# Patient Record
Sex: FEMALE | Race: WHITE | NOT HISPANIC OR LATINO | Employment: FULL TIME | URBAN - METROPOLITAN AREA
[De-identification: names, ages, dates, MRNs, and addresses within clinical notes are randomized per-mention and may not be internally consistent; named-entity substitution may affect disease eponyms.]

---

## 2023-04-26 ENCOUNTER — APPOINTMENT (OUTPATIENT)
Dept: PRIMARY CARE | Facility: CLINIC | Age: 53
End: 2023-04-26
Payer: COMMERCIAL

## 2023-05-24 DIAGNOSIS — K21.00 GASTROESOPHAGEAL REFLUX DISEASE WITH ESOPHAGITIS, UNSPECIFIED WHETHER HEMORRHAGE: ICD-10-CM

## 2023-05-24 RX ORDER — OMEPRAZOLE 40 MG/1
40 CAPSULE, DELAYED RELEASE ORAL DAILY
Qty: 30 CAPSULE | Refills: 0 | Status: SHIPPED | OUTPATIENT
Start: 2023-05-24 | End: 2023-06-29 | Stop reason: SDUPTHER

## 2023-05-24 RX ORDER — OMEPRAZOLE 40 MG/1
1 CAPSULE, DELAYED RELEASE ORAL DAILY
COMMUNITY
Start: 2012-12-27 | End: 2023-05-24 | Stop reason: SDUPTHER

## 2023-06-29 ENCOUNTER — OFFICE VISIT (OUTPATIENT)
Dept: PRIMARY CARE | Facility: CLINIC | Age: 53
End: 2023-06-29
Payer: COMMERCIAL

## 2023-06-29 VITALS
SYSTOLIC BLOOD PRESSURE: 104 MMHG | DIASTOLIC BLOOD PRESSURE: 66 MMHG | HEART RATE: 72 BPM | TEMPERATURE: 97.5 F | HEIGHT: 61 IN | WEIGHT: 167 LBS | BODY MASS INDEX: 31.53 KG/M2 | RESPIRATION RATE: 16 BRPM

## 2023-06-29 DIAGNOSIS — K42.9 UMBILICAL HERNIA WITHOUT OBSTRUCTION AND WITHOUT GANGRENE: ICD-10-CM

## 2023-06-29 DIAGNOSIS — K21.00 GASTROESOPHAGEAL REFLUX DISEASE WITH ESOPHAGITIS WITHOUT HEMORRHAGE: Primary | ICD-10-CM

## 2023-06-29 DIAGNOSIS — K21.00 GASTROESOPHAGEAL REFLUX DISEASE WITH ESOPHAGITIS, UNSPECIFIED WHETHER HEMORRHAGE: ICD-10-CM

## 2023-06-29 DIAGNOSIS — Z00.00 WELL ADULT HEALTH CHECK: ICD-10-CM

## 2023-06-29 DIAGNOSIS — E11.9 TYPE 2 DIABETES MELLITUS WITHOUT COMPLICATION, WITHOUT LONG-TERM CURRENT USE OF INSULIN (MULTI): ICD-10-CM

## 2023-06-29 DIAGNOSIS — Z23 IMMUNIZATION DUE: ICD-10-CM

## 2023-06-29 DIAGNOSIS — K22.719 BARRETT'S ESOPHAGUS WITH DYSPLASIA: ICD-10-CM

## 2023-06-29 PROBLEM — K22.70 BARRETT ESOPHAGUS: Status: ACTIVE | Noted: 2023-06-29

## 2023-06-29 PROBLEM — E66.811 OBESITY (BMI 30.0-34.9): Status: ACTIVE | Noted: 2023-06-07

## 2023-06-29 PROBLEM — E66.9 OBESITY (BMI 30.0-34.9): Status: ACTIVE | Noted: 2023-06-07

## 2023-06-29 PROBLEM — R92.8 MAMMOGRAM ABNORMAL: Status: ACTIVE | Noted: 2023-06-29

## 2023-06-29 PROCEDURE — 90472 IMMUNIZATION ADMIN EACH ADD: CPT | Performed by: INTERNAL MEDICINE

## 2023-06-29 PROCEDURE — 3074F SYST BP LT 130 MM HG: CPT | Performed by: INTERNAL MEDICINE

## 2023-06-29 PROCEDURE — 99204 OFFICE O/P NEW MOD 45 MIN: CPT | Performed by: INTERNAL MEDICINE

## 2023-06-29 PROCEDURE — 90677 PCV20 VACCINE IM: CPT | Performed by: INTERNAL MEDICINE

## 2023-06-29 PROCEDURE — 90471 IMMUNIZATION ADMIN: CPT | Performed by: INTERNAL MEDICINE

## 2023-06-29 PROCEDURE — 3078F DIAST BP <80 MM HG: CPT | Performed by: INTERNAL MEDICINE

## 2023-06-29 PROCEDURE — 1036F TOBACCO NON-USER: CPT | Performed by: INTERNAL MEDICINE

## 2023-06-29 PROCEDURE — 90715 TDAP VACCINE 7 YRS/> IM: CPT | Performed by: INTERNAL MEDICINE

## 2023-06-29 RX ORDER — OMEPRAZOLE 40 MG/1
40 CAPSULE, DELAYED RELEASE ORAL DAILY
Qty: 90 CAPSULE | Refills: 3 | Status: SHIPPED | OUTPATIENT
Start: 2023-06-29 | End: 2024-03-25 | Stop reason: SDUPTHER

## 2023-06-29 RX ORDER — METFORMIN HYDROCHLORIDE 500 MG/1
2 TABLET ORAL 2 TIMES DAILY
COMMUNITY
Start: 2013-10-30

## 2023-06-29 RX ORDER — TIRZEPATIDE 10 MG/.5ML
12.5 INJECTION, SOLUTION SUBCUTANEOUS
COMMUNITY
Start: 2023-05-02

## 2023-06-29 ASSESSMENT — ENCOUNTER SYMPTOMS
DIAPHORESIS: 0
ARTHRALGIAS: 0
EYE DISCHARGE: 0
WEAKNESS: 0
VOMITING: 0
NAUSEA: 0
HEADACHES: 0
COUGH: 0
SHORTNESS OF BREATH: 0
SPEECH DIFFICULTY: 0
FATIGUE: 0
JOINT SWELLING: 0
WHEEZING: 0
DIZZINESS: 0
CHEST TIGHTNESS: 0
DIARRHEA: 0
SORE THROAT: 0
APPETITE CHANGE: 0
SINUS PAIN: 0
FEVER: 0
DIFFICULTY URINATING: 0
FREQUENCY: 0
PALPITATIONS: 0
ABDOMINAL PAIN: 0
CHILLS: 0
CONSTIPATION: 0

## 2023-06-29 ASSESSMENT — PATIENT HEALTH QUESTIONNAIRE - PHQ9
1. LITTLE INTEREST OR PLEASURE IN DOING THINGS: NOT AT ALL
2. FEELING DOWN, DEPRESSED OR HOPELESS: NOT AT ALL
SUM OF ALL RESPONSES TO PHQ9 QUESTIONS 1 & 2: 0

## 2023-06-29 NOTE — PROGRESS NOTES
"Subjective   Patient ID: Lori Post is a 53 y.o. female who presents for OTHER (New pt , does see endocine,  requesting omperazole).    New patient and needs refills.  Reviewed past medical history and medications for DM and GERD Yeboah's esophagus.    No labs available.           Review of Systems   Constitutional:  Negative for appetite change, chills, diaphoresis, fatigue and fever.   HENT:  Negative for congestion, ear discharge, ear pain, sinus pain and sore throat.    Eyes:  Negative for discharge.   Respiratory:  Negative for cough, chest tightness, shortness of breath and wheezing.    Cardiovascular:  Negative for chest pain, palpitations and leg swelling.   Gastrointestinal:  Negative for abdominal pain, constipation, diarrhea, nausea and vomiting.   Endocrine: Negative for cold intolerance, heat intolerance and polyuria.   Genitourinary:  Negative for difficulty urinating and frequency.   Musculoskeletal:  Negative for arthralgias and joint swelling.   Skin:  Negative for rash.   Neurological:  Negative for dizziness, speech difficulty, weakness and headaches.       Objective   /66 (BP Location: Left arm, Patient Position: Sitting, BP Cuff Size: Adult)   Pulse 72   Temp 36.4 °C (97.5 °F) (Temporal)   Resp 16   Ht 1.549 m (5' 1\")   Wt 75.8 kg (167 lb)   BMI 31.55 kg/m²     Physical Exam  Constitutional:       Appearance: Normal appearance.   HENT:      Head: Normocephalic.   Eyes:      Conjunctiva/sclera: Conjunctivae normal.   Cardiovascular:      Rate and Rhythm: Normal rate and regular rhythm.   Pulmonary:      Effort: Pulmonary effort is normal.      Breath sounds: Normal breath sounds.   Abdominal:      Comments: Binder in place post op.   Musculoskeletal:      Cervical back: Neck supple.   Skin:     General: Skin is warm and dry.   Neurological:      Mental Status: She is alert.         Assessment/Plan   Problem List Items Addressed This Visit       GERD with esophagitis - Primary    " Yeboah esophagus    Type 2 diabetes mellitus without complication, without long-term current use of insulin (CMS/HCC)    Relevant Orders    CT cardiac scoring wo IV contrast    Umbilical hernia without obstruction and without gangrene     Other Visit Diagnoses       Well adult health check        Relevant Orders    CT cardiac scoring wo IV contrast    Immunization due        Relevant Orders    Pneumococcal conjugate vaccine, 20-valent, adult (PREVNAR 20)    Tdap vaccine, age 10 years and older (BOOSTRIX)

## 2023-09-29 ENCOUNTER — OFFICE VISIT (OUTPATIENT)
Dept: PRIMARY CARE | Facility: CLINIC | Age: 53
End: 2023-09-29
Payer: COMMERCIAL

## 2023-09-29 VITALS
BODY MASS INDEX: 28.47 KG/M2 | HEART RATE: 86 BPM | RESPIRATION RATE: 16 BRPM | WEIGHT: 150.8 LBS | HEIGHT: 61 IN | SYSTOLIC BLOOD PRESSURE: 103 MMHG | TEMPERATURE: 97.8 F | DIASTOLIC BLOOD PRESSURE: 73 MMHG | OXYGEN SATURATION: 95 %

## 2023-09-29 DIAGNOSIS — K22.719 BARRETT'S ESOPHAGUS WITH DYSPLASIA: ICD-10-CM

## 2023-09-29 DIAGNOSIS — K42.9 UMBILICAL HERNIA WITHOUT OBSTRUCTION AND WITHOUT GANGRENE: ICD-10-CM

## 2023-09-29 DIAGNOSIS — Z23 IMMUNIZATION DUE: ICD-10-CM

## 2023-09-29 DIAGNOSIS — Z12.31 BREAST CANCER SCREENING BY MAMMOGRAM: ICD-10-CM

## 2023-09-29 DIAGNOSIS — E11.9 TYPE 2 DIABETES MELLITUS WITHOUT COMPLICATION, WITHOUT LONG-TERM CURRENT USE OF INSULIN (MULTI): ICD-10-CM

## 2023-09-29 DIAGNOSIS — R63.4 WEIGHT LOSS: ICD-10-CM

## 2023-09-29 DIAGNOSIS — K21.00 GASTROESOPHAGEAL REFLUX DISEASE WITH ESOPHAGITIS WITHOUT HEMORRHAGE: Primary | ICD-10-CM

## 2023-09-29 DIAGNOSIS — Z00.00 WELL ADULT HEALTH CHECK: ICD-10-CM

## 2023-09-29 DIAGNOSIS — K12.1 STOMATITIS: ICD-10-CM

## 2023-09-29 PROBLEM — Z98.890 H/O UMBILICAL HERNIA REPAIR: Status: ACTIVE | Noted: 2023-09-29

## 2023-09-29 PROBLEM — E66.811 OBESITY (BMI 30.0-34.9): Status: RESOLVED | Noted: 2023-06-07 | Resolved: 2023-09-29

## 2023-09-29 PROBLEM — Z87.19 H/O UMBILICAL HERNIA REPAIR: Status: ACTIVE | Noted: 2023-09-29

## 2023-09-29 PROBLEM — E66.9 OBESITY (BMI 30.0-34.9): Status: RESOLVED | Noted: 2023-06-07 | Resolved: 2023-09-29

## 2023-09-29 PROCEDURE — 90471 IMMUNIZATION ADMIN: CPT | Performed by: INTERNAL MEDICINE

## 2023-09-29 PROCEDURE — 90739 HEPB VACC 2/4 DOSE ADULT IM: CPT | Performed by: INTERNAL MEDICINE

## 2023-09-29 PROCEDURE — 90632 HEPA VACCINE ADULT IM: CPT | Performed by: INTERNAL MEDICINE

## 2023-09-29 PROCEDURE — 3078F DIAST BP <80 MM HG: CPT | Performed by: INTERNAL MEDICINE

## 2023-09-29 PROCEDURE — 3074F SYST BP LT 130 MM HG: CPT | Performed by: INTERNAL MEDICINE

## 2023-09-29 PROCEDURE — 99396 PREV VISIT EST AGE 40-64: CPT | Performed by: INTERNAL MEDICINE

## 2023-09-29 PROCEDURE — 90472 IMMUNIZATION ADMIN EACH ADD: CPT | Performed by: INTERNAL MEDICINE

## 2023-09-29 PROCEDURE — 3008F BODY MASS INDEX DOCD: CPT | Performed by: INTERNAL MEDICINE

## 2023-09-29 PROCEDURE — 1036F TOBACCO NON-USER: CPT | Performed by: INTERNAL MEDICINE

## 2023-09-29 RX ORDER — MULTIVITAMIN
1 TABLET ORAL DAILY
COMMUNITY

## 2023-09-29 ASSESSMENT — PAIN SCALES - GENERAL: PAINLEVEL: 0-NO PAIN

## 2023-09-29 NOTE — PROGRESS NOTES
"Subjective   Lori Post is a 53 y.o. female who presents for Annual Exam.      Well Adult Physical   Patient here for a comprehensive physical exam.The patient reports problems - dry lips, and not sure why due to the fact that she does not lick her lips, she has read online that it could be low B12     Do you take any herbs or supplements that were not prescribed by a doctor? no   Are you taking calcium supplements? no   Are you taking aspirin daily? no     History:  LMP: No LMP recorded.  Menopause at 50-51 years  Last pap date: about a year ago, has an apt. Coming up in  with her gynecologist   Abnormal pap? no  : 2  Para: 3  Last mammogram was 10.24.2022- getting mammogram next month as well             Review of Systems   All other systems reviewed and are negative.      Objective   /73   Pulse 86   Temp 36.6 °C (97.8 °F)   Resp 16   Ht 1.549 m (5' 1\")   Wt 68.4 kg (150 lb 12.8 oz)   SpO2 95%   BMI 28.49 kg/m²       Physical Exam  Constitutional:       Appearance: Normal appearance.   HENT:      Head: Normocephalic.   Eyes:      Conjunctiva/sclera: Conjunctivae normal.   Cardiovascular:      Rate and Rhythm: Normal rate and regular rhythm.   Pulmonary:      Effort: Pulmonary effort is normal.      Breath sounds: Normal breath sounds.   Abdominal:      General: Abdomen is flat.      Palpations: Abdomen is soft.   Musculoskeletal:      Cervical back: Neck supple.   Skin:     General: Skin is warm and dry.   Neurological:      Mental Status: She is alert.         Assessment/Plan   Problem List Items Addressed This Visit       GERD with esophagitis - Primary    Relevant Orders    Referral to General Surgery    CBC    Yeboah esophagus    Relevant Orders    Referral to General Surgery    CBC    Type 2 diabetes mellitus without complication, without long-term current use of insulin (CMS/[facility])    Relevant Orders    CBC    RESOLVED: Umbilical hernia without obstruction and without " gangrene    Weight loss     Began October of 2022 at 222.  Loss of 72 pounds since then.  Outstanding turnaround with exercise and diet.            Other Visit Diagnoses       Breast cancer screening by mammogram        Relevant Orders    CBC    BI mammo bilateral screening tomosynthesis    Well adult health check        Relevant Orders    Referral to General Surgery    Lipid Panel    TSH with reflex to Free T4 if abnormal    Comprehensive Metabolic Panel    CBC    BI mammo bilateral screening tomosynthesis    Hepatitis A vaccine, age 19 years and greater (HAVRIX) (Completed)    Hepatitis B vaccine, adult (HEPLISAV) (Completed)    Stomatitis        Relevant Orders    Vitamin B12    Vitamin D 25-Hydroxy,Total (for eval of Vitamin D levels)    Zinc    Immunization due        Relevant Orders    Hepatitis A vaccine, age 19 years and greater (HAVRIX) (Completed)    Hepatitis B vaccine, adult (HEPLISAV) (Completed)    BMI 28.0-28.9,adult              Encounter Diagnoses   Name Primary?    Gastroesophageal reflux disease with esophagitis without hemorrhage Yes    Yeboah's esophagus with dysplasia     Type 2 diabetes mellitus without complication, without long-term current use of insulin (CMS/HCC)     Breast cancer screening by mammogram     Well adult health check     Weight loss     Umbilical hernia without obstruction and without gangrene     Stomatitis     Immunization due     BMI 28.0-28.9,adult      Real Iglesias, DO

## 2023-09-29 NOTE — ASSESSMENT & PLAN NOTE
Began October of 2022 at 222.  Loss of 72 pounds since then.  Outstanding turnaround with exercise and diet.

## 2023-10-20 ENCOUNTER — ANCILLARY PROCEDURE (OUTPATIENT)
Dept: RADIOLOGY | Facility: CLINIC | Age: 53
End: 2023-10-20
Payer: COMMERCIAL

## 2023-10-20 DIAGNOSIS — Z00.00 WELL ADULT HEALTH CHECK: ICD-10-CM

## 2023-10-20 DIAGNOSIS — Z12.31 BREAST CANCER SCREENING BY MAMMOGRAM: ICD-10-CM

## 2023-10-20 PROCEDURE — 77067 SCR MAMMO BI INCL CAD: CPT

## 2023-10-20 PROCEDURE — 77063 BREAST TOMOSYNTHESIS BI: CPT | Mod: BILATERAL PROCEDURE | Performed by: RADIOLOGY

## 2023-10-20 PROCEDURE — 77067 SCR MAMMO BI INCL CAD: CPT | Mod: BILATERAL PROCEDURE | Performed by: RADIOLOGY

## 2024-03-25 ENCOUNTER — HOSPITAL ENCOUNTER (OUTPATIENT)
Dept: RADIOLOGY | Facility: CLINIC | Age: 54
Discharge: HOME | End: 2024-03-25
Payer: COMMERCIAL

## 2024-03-25 ENCOUNTER — OFFICE VISIT (OUTPATIENT)
Dept: PRIMARY CARE | Facility: CLINIC | Age: 54
End: 2024-03-25
Payer: COMMERCIAL

## 2024-03-25 VITALS
TEMPERATURE: 97.6 F | DIASTOLIC BLOOD PRESSURE: 76 MMHG | OXYGEN SATURATION: 98 % | WEIGHT: 139 LBS | HEART RATE: 78 BPM | RESPIRATION RATE: 16 BRPM | BODY MASS INDEX: 23.16 KG/M2 | HEIGHT: 65 IN | SYSTOLIC BLOOD PRESSURE: 118 MMHG

## 2024-03-25 DIAGNOSIS — K21.00 GASTROESOPHAGEAL REFLUX DISEASE WITH ESOPHAGITIS, UNSPECIFIED WHETHER HEMORRHAGE: ICD-10-CM

## 2024-03-25 DIAGNOSIS — M53.3 CHRONIC COCCYGEAL PAIN: ICD-10-CM

## 2024-03-25 DIAGNOSIS — Z00.00 WELL ADULT HEALTH CHECK: ICD-10-CM

## 2024-03-25 DIAGNOSIS — E11.9 TYPE 2 DIABETES MELLITUS WITHOUT COMPLICATION, WITHOUT LONG-TERM CURRENT USE OF INSULIN (MULTI): ICD-10-CM

## 2024-03-25 DIAGNOSIS — G89.29 CHRONIC COCCYGEAL PAIN: ICD-10-CM

## 2024-03-25 DIAGNOSIS — Z23 IMMUNIZATION DUE: Primary | ICD-10-CM

## 2024-03-25 PROCEDURE — 3078F DIAST BP <80 MM HG: CPT | Performed by: INTERNAL MEDICINE

## 2024-03-25 PROCEDURE — 90471 IMMUNIZATION ADMIN: CPT | Performed by: INTERNAL MEDICINE

## 2024-03-25 PROCEDURE — 72220 X-RAY EXAM SACRUM TAILBONE: CPT

## 2024-03-25 PROCEDURE — 99213 OFFICE O/P EST LOW 20 MIN: CPT | Performed by: INTERNAL MEDICINE

## 2024-03-25 PROCEDURE — 90632 HEPA VACCINE ADULT IM: CPT | Performed by: INTERNAL MEDICINE

## 2024-03-25 PROCEDURE — 72220 X-RAY EXAM SACRUM TAILBONE: CPT | Performed by: RADIOLOGY

## 2024-03-25 PROCEDURE — 3008F BODY MASS INDEX DOCD: CPT | Performed by: INTERNAL MEDICINE

## 2024-03-25 PROCEDURE — 3074F SYST BP LT 130 MM HG: CPT | Performed by: INTERNAL MEDICINE

## 2024-03-25 PROCEDURE — 90739 HEPB VACC 2/4 DOSE ADULT IM: CPT | Performed by: INTERNAL MEDICINE

## 2024-03-25 PROCEDURE — 1036F TOBACCO NON-USER: CPT | Performed by: INTERNAL MEDICINE

## 2024-03-25 PROCEDURE — 90472 IMMUNIZATION ADMIN EACH ADD: CPT | Performed by: INTERNAL MEDICINE

## 2024-03-25 RX ORDER — OMEPRAZOLE 40 MG/1
40 CAPSULE, DELAYED RELEASE ORAL DAILY
Qty: 90 CAPSULE | Refills: 3 | Status: SHIPPED | OUTPATIENT
Start: 2024-03-25 | End: 2025-03-25

## 2024-03-25 ASSESSMENT — PAIN SCALES - GENERAL: PAINLEVEL: 6

## 2024-03-25 NOTE — PROGRESS NOTES
"Subjective   Lori Post is a 54 y.o. female who presents for Follow-up (Labs ) and tail bone pain   Refill omeprazole     Patient seeing an endocrinologist through [facility], last A1C was 5.5% on 3.4.2024        Patient states that she has been having some tailbone pain for at least 6 months states that she sits most of the day   Pain does not radiate.  Patient feels like when she sits in the even time to watch tv she will have to constantly readjust to even out the weight  Patient states that she has not had any falls or injury to the tailbone area.  Patient will wake up with no pain, then pain will progress throughout the day.  Thinks it is due to weight loss and does sit all day.  Does not notice while at work but does not use a cushion.           Review of Systems   All other systems reviewed and are negative.      Objective   /76   Pulse 78   Temp 36.4 °C (97.6 °F)   Resp 16   Ht 1.651 m (5' 5\")   Wt 63 kg (139 lb)   SpO2 98%   BMI 23.13 kg/m²       Physical Exam  Constitutional:       Appearance: Normal appearance.   HENT:      Head: Normocephalic.   Eyes:      Conjunctiva/sclera: Conjunctivae normal.   Cardiovascular:      Rate and Rhythm: Normal rate and regular rhythm.      Heart sounds: Normal heart sounds.   Pulmonary:      Effort: Pulmonary effort is normal.      Breath sounds: Normal breath sounds.   Musculoskeletal:      Cervical back: Neck supple.      Comments: Tenderness and coccyx, to palpation but tolerated exam well.    Skin:     General: Skin is warm and dry.   Neurological:      Mental Status: She is alert.   Psychiatric:         Mood and Affect: Mood normal.         Assessment/Plan   Problem List Items Addressed This Visit       GERD with esophagitis    Relevant Medications    omeprazole (PriLOSEC) 40 mg DR capsule    Type 2 diabetes mellitus without complication, without long-term current use of insulin (CMS/[facility])     Other Visit Diagnoses       Immunization due    -  Primary    " Relevant Orders    Hepatitis A vaccine, age 19 years and greater (HAVRIX)    Well adult health check        Relevant Orders    Hepatitis B vaccine, adult (HEPLISAV)    Chronic coccygeal pain        Relevant Orders    XR sacrum coccyx 2+ views          Encounter Diagnoses   Name Primary?    Gastroesophageal reflux disease with esophagitis, unspecified whether hemorrhage     Type 2 diabetes mellitus without complication, without long-term current use of insulin (CMS/[facility])     Immunization due Yes    Well adult health check     Chronic coccygeal pain      Will give second set of immunizations.  Concern about pain at tailbone and has a history of bruising there.    Check x-ray results and follow up in six months if this is negative.  Recommended cushion for long periods of sitting.      Real Iglesias, DO

## 2024-09-30 ENCOUNTER — APPOINTMENT (OUTPATIENT)
Dept: PRIMARY CARE | Facility: CLINIC | Age: 54
End: 2024-09-30
Payer: COMMERCIAL

## 2024-09-30 VITALS
WEIGHT: 136.4 LBS | SYSTOLIC BLOOD PRESSURE: 104 MMHG | RESPIRATION RATE: 16 BRPM | DIASTOLIC BLOOD PRESSURE: 62 MMHG | OXYGEN SATURATION: 95 % | HEIGHT: 61 IN | TEMPERATURE: 97.6 F | BODY MASS INDEX: 25.75 KG/M2 | HEART RATE: 80 BPM

## 2024-09-30 DIAGNOSIS — E11.9 TYPE 2 DIABETES MELLITUS WITHOUT COMPLICATION, WITHOUT LONG-TERM CURRENT USE OF INSULIN (MULTI): ICD-10-CM

## 2024-09-30 DIAGNOSIS — K21.00 GASTROESOPHAGEAL REFLUX DISEASE WITH ESOPHAGITIS, UNSPECIFIED WHETHER HEMORRHAGE: ICD-10-CM

## 2024-09-30 DIAGNOSIS — Z00.00 WELL ADULT HEALTH CHECK: Primary | ICD-10-CM

## 2024-09-30 DIAGNOSIS — M53.3 CHRONIC COCCYGEAL PAIN: ICD-10-CM

## 2024-09-30 DIAGNOSIS — Z23 IMMUNIZATION DUE: ICD-10-CM

## 2024-09-30 DIAGNOSIS — G89.29 CHRONIC COCCYGEAL PAIN: ICD-10-CM

## 2024-09-30 PROCEDURE — 3074F SYST BP LT 130 MM HG: CPT | Performed by: INTERNAL MEDICINE

## 2024-09-30 PROCEDURE — 99396 PREV VISIT EST AGE 40-64: CPT | Performed by: INTERNAL MEDICINE

## 2024-09-30 PROCEDURE — 3078F DIAST BP <80 MM HG: CPT | Performed by: INTERNAL MEDICINE

## 2024-09-30 PROCEDURE — 3008F BODY MASS INDEX DOCD: CPT | Performed by: INTERNAL MEDICINE

## 2024-09-30 PROCEDURE — 1036F TOBACCO NON-USER: CPT | Performed by: INTERNAL MEDICINE

## 2024-09-30 ASSESSMENT — PROMIS GLOBAL HEALTH SCALE
RATE_SOCIAL_SATISFACTION: EXCELLENT
RATE_GENERAL_HEALTH: EXCELLENT
RATE_AVERAGE_PAIN: 0
EMOTIONAL_PROBLEMS: NEVER
CARRYOUT_SOCIAL_ACTIVITIES: EXCELLENT
CARRYOUT_PHYSICAL_ACTIVITIES: COMPLETELY
RATE_PHYSICAL_HEALTH: VERY GOOD
RATE_MENTAL_HEALTH: EXCELLENT
RATE_QUALITY_OF_LIFE: EXCELLENT

## 2024-09-30 ASSESSMENT — PAIN SCALES - GENERAL: PAINLEVEL: 0-NO PAIN

## 2024-09-30 NOTE — ASSESSMENT & PLAN NOTE
Stable and well controlled with current medications.  Reviewed recommendations for ARB and Statins for diabetics, however given current LDL and BP, will hold additional medications pending calcium scoring, per patient request.

## 2024-09-30 NOTE — PROGRESS NOTES
"Subjective   Lori Post is a 54 y.o. female who presents for Annual Exam.    Patient declines flu vaccine due to getting it at work Thursday 10.3.2024  Well Adult Physical   Patient here for a comprehensive physical exam.The patient reports no problems    Do you take any herbs or supplements that were not prescribed by a doctor? yes   Are you taking calcium supplements? no   Are you taking aspirin daily? no     History:  LMP: No LMP recorded. Patient is postmenopausal.  Menopause at 49 years  Last pap date:   Abnormal pap? no  : 2  Para: 3  Mammogram: 10.20.2023  Dexa: 10.22.2021  Colonoscopy: 2018  EGD           Review of Systems   All other systems reviewed and are negative.      Objective   /62   Pulse 80   Temp 36.4 °C (97.6 °F)   Resp 16   Ht 1.549 m (5' 1\")   Wt 61.9 kg (136 lb 6.4 oz)   SpO2 95%   BMI 25.77 kg/m²       Physical Exam  Constitutional:       General: She is not in acute distress.     Appearance: She is not toxic-appearing.   HENT:      Right Ear: Tympanic membrane, ear canal and external ear normal. There is no impacted cerumen.      Left Ear: Tympanic membrane, ear canal and external ear normal. There is no impacted cerumen.      Nose: Nose normal.      Mouth/Throat:      Mouth: Mucous membranes are dry.      Pharynx: Oropharynx is clear.   Eyes:      Extraocular Movements: Extraocular movements intact.      Conjunctiva/sclera: Conjunctivae normal.      Pupils: Pupils are equal, round, and reactive to light.   Cardiovascular:      Rate and Rhythm: Normal rate and regular rhythm.      Pulses:           Dorsalis pedis pulses are 1+ on the right side and 1+ on the left side.        Posterior tibial pulses are 1+ on the right side and 1+ on the left side.      Heart sounds: Normal heart sounds.   Pulmonary:      Effort: Pulmonary effort is normal.      Breath sounds: Normal breath sounds.   Abdominal:      General: Abdomen is flat.   Musculoskeletal:      Cervical back: " Normal range of motion and neck supple.      Right lower leg: No edema.      Left lower leg: No edema.   Feet:      Right foot:      Protective Sensation: 3 sites tested.  3 sites sensed.      Skin integrity: Skin integrity normal.      Left foot:      Protective Sensation: 3 sites tested.  3 sites sensed.      Skin integrity: Skin integrity normal.   Skin:     General: Skin is warm and dry.   Neurological:      Mental Status: She is alert.   Psychiatric:         Mood and Affect: Mood normal.         Behavior: Behavior normal.         Assessment & Plan  Well adult health check  Dong well and without new complaints.  Orders:    CT cardiac scoring wo IV contrast; Future    Type 2 diabetes mellitus without complication, without long-term current use of insulin (Multi)  Stable and well controlled with current medications.  Reviewed recommendations for ARB and Statins for diabetics, however given current LDL and BP, will hold additional medications pending calcium scoring, per patient request.         Gastroesophageal reflux disease with esophagitis, unspecified whether hemorrhage  Will find EGD Colonoscopy reports.  Orders:    Follow Up In [facility] - PCP - Established         Real Iglesias DO

## 2024-09-30 NOTE — ASSESSMENT & PLAN NOTE
Will find EGD Colonoscopy reports.  Orders:    Follow Up In [facility] - PCP - Established

## 2024-11-11 ENCOUNTER — HOSPITAL ENCOUNTER (OUTPATIENT)
Dept: RADIOLOGY | Facility: CLINIC | Age: 54
Discharge: HOME | End: 2024-11-11
Payer: COMMERCIAL

## 2024-11-11 VITALS — WEIGHT: 139 LBS | HEIGHT: 61 IN | BODY MASS INDEX: 26.24 KG/M2

## 2024-11-11 DIAGNOSIS — Z12.31 ENCOUNTER FOR SCREENING MAMMOGRAM FOR MALIGNANT NEOPLASM OF BREAST: ICD-10-CM

## 2024-11-11 PROCEDURE — 77067 SCR MAMMO BI INCL CAD: CPT | Performed by: RADIOLOGY

## 2024-11-11 PROCEDURE — 77067 SCR MAMMO BI INCL CAD: CPT

## 2024-11-11 PROCEDURE — 77063 BREAST TOMOSYNTHESIS BI: CPT | Performed by: RADIOLOGY

## 2024-11-15 ENCOUNTER — APPOINTMENT (OUTPATIENT)
Dept: RADIOLOGY | Facility: CLINIC | Age: 54
End: 2024-11-15
Payer: COMMERCIAL

## 2025-03-31 ENCOUNTER — APPOINTMENT (OUTPATIENT)
Dept: PRIMARY CARE | Facility: CLINIC | Age: 55
End: 2025-03-31
Payer: COMMERCIAL

## 2025-04-04 ENCOUNTER — APPOINTMENT (OUTPATIENT)
Dept: PRIMARY CARE | Facility: CLINIC | Age: 55
End: 2025-04-04
Payer: COMMERCIAL

## 2025-04-04 VITALS
DIASTOLIC BLOOD PRESSURE: 70 MMHG | HEIGHT: 61 IN | RESPIRATION RATE: 16 BRPM | HEART RATE: 78 BPM | SYSTOLIC BLOOD PRESSURE: 110 MMHG | WEIGHT: 139.2 LBS | OXYGEN SATURATION: 99 % | BODY MASS INDEX: 26.28 KG/M2

## 2025-04-04 DIAGNOSIS — D12.6 TUBULAR ADENOMA OF COLON: ICD-10-CM

## 2025-04-04 DIAGNOSIS — E11.9 TYPE 2 DIABETES MELLITUS WITHOUT COMPLICATION, WITHOUT LONG-TERM CURRENT USE OF INSULIN: Primary | ICD-10-CM

## 2025-04-04 DIAGNOSIS — R63.4 WEIGHT LOSS: ICD-10-CM

## 2025-04-04 DIAGNOSIS — K22.719 BARRETT'S ESOPHAGUS WITH DYSPLASIA: ICD-10-CM

## 2025-04-04 DIAGNOSIS — K21.00 GASTROESOPHAGEAL REFLUX DISEASE WITH ESOPHAGITIS WITHOUT HEMORRHAGE: ICD-10-CM

## 2025-04-04 ASSESSMENT — PATIENT HEALTH QUESTIONNAIRE - PHQ9
2. FEELING DOWN, DEPRESSED OR HOPELESS: NOT AT ALL
SUM OF ALL RESPONSES TO PHQ9 QUESTIONS 1 AND 2: 0
1. LITTLE INTEREST OR PLEASURE IN DOING THINGS: NOT AT ALL

## 2025-04-04 ASSESSMENT — ENCOUNTER SYMPTOMS
PALPITATIONS: 0
CHILLS: 0
SORE THROAT: 0
FEVER: 0
TROUBLE SWALLOWING: 0
SHORTNESS OF BREATH: 0
ABDOMINAL PAIN: 0

## 2025-04-04 ASSESSMENT — PAIN SCALES - GENERAL: PAINLEVEL_OUTOF10: 0-NO PAIN

## 2025-04-04 NOTE — PROGRESS NOTES
"Subjective   Lori Post is a 55 y.o. female who presents for Follow-up.  Had labs done through [facility] on 3.10.2025     Patient is seeing an endocrinologist at [facility] for diabetes control   Last A1C was 5.3% on 3.10.2025  Patient would like an order for a EGD for her GERD, states that she is over due for one   Has trouble with sleeping, has been an issue for awhile, states that she use to take melatonin and reports that it no longer helps so has been taking a tylenol PM on an occasion   Has trouble falling sleep, once asleep she stays asleep   Use to take her at least 30 minutes to fall asleep, now it can take up to 2 hours to fall asleep- not every night, some nights she is able to pass right out due to being exhausted       Review of Systems   Constitutional:  Negative for chills and fever.   HENT:  Negative for sore throat and trouble swallowing.    Respiratory:  Negative for shortness of breath.    Cardiovascular:  Negative for chest pain, palpitations and leg swelling.   Gastrointestinal:  Negative for abdominal pain.   Skin:  Negative for rash.   All other systems reviewed and are negative.      Objective   /70   Pulse 78   Resp 16   Ht 1.549 m (5' 1\")   Wt 63.1 kg (139 lb 3.2 oz)   SpO2 99%   BMI 26.30 kg/m²       Physical Exam  Constitutional:       Appearance: Normal appearance.   HENT:      Head: Normocephalic.   Eyes:      Conjunctiva/sclera: Conjunctivae normal.   Cardiovascular:      Rate and Rhythm: Normal rate and regular rhythm.      Heart sounds: Normal heart sounds.   Pulmonary:      Effort: Pulmonary effort is normal.      Breath sounds: Normal breath sounds.   Musculoskeletal:      Cervical back: Neck supple.   Skin:     General: Skin is warm and dry.   Neurological:      Mental Status: She is alert.       Assessment & Plan  Type 2 diabetes mellitus without complication, without long-term current use of insulin         Weight loss         Gastroesophageal reflux disease with esophagitis " without hemorrhage         Yeboah's esophagus with dysplasia    Orders:  •  Colonoscopy Screening; Average Risk Patient; Future  •  Esophagogastroduodenoscopy (EGD); Future    Tubular adenoma of colon    Orders:  •  Colonoscopy Screening; Average Risk Patient; Future  •  Esophagogastroduodenoscopy (EGD); Future         Real Iglesias DO

## 2025-04-04 NOTE — LETTER
April 4, 2025     Patient: Lori Post   YOB: 1970   Date of Visit: 4/4/2025       To Whom It May Concern:    Lori Post was seen in my clinic on 4/4/2025 at 3:20 pm. Please excuse Lori for her absence from work on this day to make the appointment.    If you have any questions or concerns, please don't hesitate to call.         Sincerely,         Real Iglesias,         CC: No Recipients

## 2025-04-04 NOTE — ASSESSMENT & PLAN NOTE
Orders:    Colonoscopy Screening; Average Risk Patient; Future    Esophagogastroduodenoscopy (EGD); Future

## 2025-04-07 DIAGNOSIS — K21.00 GASTROESOPHAGEAL REFLUX DISEASE WITH ESOPHAGITIS, UNSPECIFIED WHETHER HEMORRHAGE: ICD-10-CM

## 2025-04-07 RX ORDER — OMEPRAZOLE 40 MG/1
40 CAPSULE, DELAYED RELEASE ORAL DAILY
Qty: 90 CAPSULE | Refills: 0 | Status: SHIPPED | OUTPATIENT
Start: 2025-04-07 | End: 2025-07-06

## 2025-04-16 DIAGNOSIS — Z12.11 COLON CANCER SCREENING: ICD-10-CM

## 2025-04-16 RX ORDER — SODIUM, POTASSIUM,MAG SULFATES 17.5-3.13G
1 SOLUTION, RECONSTITUTED, ORAL ORAL EVERY 12 HOURS
Qty: 2 EACH | Refills: 0 | Status: SHIPPED | OUTPATIENT
Start: 2025-04-16

## 2025-06-06 ENCOUNTER — ANESTHESIA EVENT (OUTPATIENT)
Dept: GASTROENTEROLOGY | Facility: EXTERNAL LOCATION | Age: 55
End: 2025-06-06

## 2025-06-19 ENCOUNTER — ANESTHESIA (OUTPATIENT)
Dept: GASTROENTEROLOGY | Facility: EXTERNAL LOCATION | Age: 55
End: 2025-06-19

## 2025-06-19 ENCOUNTER — APPOINTMENT (OUTPATIENT)
Dept: GASTROENTEROLOGY | Facility: EXTERNAL LOCATION | Age: 55
End: 2025-06-19
Payer: COMMERCIAL

## 2025-06-19 VITALS
RESPIRATION RATE: 12 BRPM | HEART RATE: 73 BPM | DIASTOLIC BLOOD PRESSURE: 80 MMHG | OXYGEN SATURATION: 99 % | HEIGHT: 61 IN | WEIGHT: 140 LBS | SYSTOLIC BLOOD PRESSURE: 108 MMHG | BODY MASS INDEX: 26.43 KG/M2 | TEMPERATURE: 97 F

## 2025-06-19 DIAGNOSIS — D12.6 TUBULAR ADENOMA OF COLON: ICD-10-CM

## 2025-06-19 DIAGNOSIS — K22.719 BARRETT'S ESOPHAGUS WITH DYSPLASIA: ICD-10-CM

## 2025-06-19 PROBLEM — K21.9 GASTROESOPHAGEAL REFLUX DISEASE: Status: ACTIVE | Noted: 2025-06-19

## 2025-06-19 PROCEDURE — 45385 COLONOSCOPY W/LESION REMOVAL: CPT | Performed by: INTERNAL MEDICINE

## 2025-06-19 PROCEDURE — 43239 EGD BIOPSY SINGLE/MULTIPLE: CPT | Performed by: INTERNAL MEDICINE

## 2025-06-19 RX ORDER — ONDANSETRON HYDROCHLORIDE 2 MG/ML
4 INJECTION, SOLUTION INTRAVENOUS ONCE AS NEEDED
Status: DISCONTINUED | OUTPATIENT
Start: 2025-06-19 | End: 2025-06-20 | Stop reason: HOSPADM

## 2025-06-19 RX ORDER — PHENYLEPHRINE HYDROCHLORIDE 10 MG/ML
INJECTION INTRAVENOUS AS NEEDED
Status: DISCONTINUED | OUTPATIENT
Start: 2025-06-19 | End: 2025-06-19

## 2025-06-19 RX ORDER — GLYCOPYRROLATE 0.2 MG/ML
INJECTION INTRAMUSCULAR; INTRAVENOUS AS NEEDED
Status: DISCONTINUED | OUTPATIENT
Start: 2025-06-19 | End: 2025-06-19

## 2025-06-19 RX ORDER — LIDOCAINE HYDROCHLORIDE 20 MG/ML
INJECTION, SOLUTION INFILTRATION; PERINEURAL AS NEEDED
Status: DISCONTINUED | OUTPATIENT
Start: 2025-06-19 | End: 2025-06-19

## 2025-06-19 RX ORDER — PROPOFOL 10 MG/ML
INJECTION, EMULSION INTRAVENOUS AS NEEDED
Status: DISCONTINUED | OUTPATIENT
Start: 2025-06-19 | End: 2025-06-19

## 2025-06-19 RX ADMIN — PROPOFOL 50 MG: 10 INJECTION, EMULSION INTRAVENOUS at 13:08

## 2025-06-19 RX ADMIN — PROPOFOL 50 MG: 10 INJECTION, EMULSION INTRAVENOUS at 12:57

## 2025-06-19 RX ADMIN — GLYCOPYRROLATE 0.2 MG: 0.2 INJECTION INTRAMUSCULAR; INTRAVENOUS at 13:06

## 2025-06-19 RX ADMIN — LIDOCAINE HYDROCHLORIDE 2.5 ML: 20 INJECTION, SOLUTION INFILTRATION; PERINEURAL at 12:56

## 2025-06-19 RX ADMIN — PROPOFOL 50 MG: 10 INJECTION, EMULSION INTRAVENOUS at 13:03

## 2025-06-19 RX ADMIN — PHENYLEPHRINE HYDROCHLORIDE 100 MCG: 10 INJECTION INTRAVENOUS at 13:07

## 2025-06-19 RX ADMIN — PROPOFOL 50 MG: 10 INJECTION, EMULSION INTRAVENOUS at 13:05

## 2025-06-19 RX ADMIN — PHENYLEPHRINE HYDROCHLORIDE 100 MCG: 10 INJECTION INTRAVENOUS at 13:09

## 2025-06-19 RX ADMIN — PROPOFOL 50 MG: 10 INJECTION, EMULSION INTRAVENOUS at 13:01

## 2025-06-19 RX ADMIN — PROPOFOL 100 MG: 10 INJECTION, EMULSION INTRAVENOUS at 12:56

## 2025-06-19 RX ADMIN — PROPOFOL 50 MG: 10 INJECTION, EMULSION INTRAVENOUS at 12:59

## 2025-06-19 RX ADMIN — PROPOFOL 50 MG: 10 INJECTION, EMULSION INTRAVENOUS at 13:11

## 2025-06-19 RX ADMIN — PROPOFOL 50 MG: 10 INJECTION, EMULSION INTRAVENOUS at 13:13

## 2025-06-19 SDOH — HEALTH STABILITY: MENTAL HEALTH: CURRENT SMOKER: 0

## 2025-06-19 ASSESSMENT — PAIN SCALES - GENERAL
PAIN_LEVEL: 0
PAINLEVEL_OUTOF10: 0 - NO PAIN

## 2025-06-19 ASSESSMENT — COLUMBIA-SUICIDE SEVERITY RATING SCALE - C-SSRS
2. HAVE YOU ACTUALLY HAD ANY THOUGHTS OF KILLING YOURSELF?: NO
1. IN THE PAST MONTH, HAVE YOU WISHED YOU WERE DEAD OR WISHED YOU COULD GO TO SLEEP AND NOT WAKE UP?: NO
6. HAVE YOU EVER DONE ANYTHING, STARTED TO DO ANYTHING, OR PREPARED TO DO ANYTHING TO END YOUR LIFE?: NO

## 2025-06-19 ASSESSMENT — PAIN - FUNCTIONAL ASSESSMENT
PAIN_FUNCTIONAL_ASSESSMENT: 0-10

## 2025-06-19 NOTE — H&P
Outpatient Hospital Procedure    Patient Profile-Procedures  Initial Info  Patient Demographics  Name Lori Post  Date of Birth 1970  MRN [id number]  Address   [address] [place] [id number] [facility] [address]    Primary Phone Number [phone]  Secondary Phone Number    Swapna Clinton    Procedures   EGD   and Colonoscopy      Indication:  Hx of Barretts - neg bx last EGD 2019  Screening - avg risk  (TA in 2017)    Anesthesia Indication:   Primary contact name and number   Extended Emergency Contact Information  Primary Emergency Contact: Chelly Post  Address: [address] [place] [id number] [facility]  Home Phone: [phone]  Work Phone: [phone]  Relation: Spouse    General Health  Weight   Vitals:    06/19/25 1217   Weight: 63.5 kg (140 lb)     BMI Body mass index is 26.45 kg/m².    Allergies  RX Allergies[1]    Past Medical History   Medical History[2]    Provider assessment  Diagnosis  Medication Reviewed - yes  Prior to Admission medications    Medication Sig Start Date End Date Taking? Authorizing Provider   metFORMIN (Glucophage) 500 mg tablet Take 2 tablets (1,000 mg) by mouth 2 times a day.  Patient taking differently: Take 2 tablets (1,000 mg) by mouth once daily. 10/30/13  Yes Historical Provider, MD   multivitamin tablet Take 1 tablet by mouth once daily.   Yes Historical Provider, MD   omeprazole (PriLOSEC) 40 mg DR capsule Take 1 capsule (40 mg) by mouth once daily. 4/7/25 7/6/25 Yes DO David Garber 10 mg/0.5 mL pen injector 12.5 mg every 7 days. 5/2/23   Historical Provider, MD   sodium,potassium,mag sulfates (Suprep Bowel Prep Kit) 17.5-3.13-1.6 gram solution Take 1 bottle by mouth every 12 hours. 4/16/25 6/19/25  Cora MCGOWAN MD       This is my H&P    Physical Exam  Physical Exam  Constitutional:       Comments: Awake   HENT:      Head: Normocephalic.   Cardiovascular:      Rate and Rhythm: Normal rate and  regular rhythm.   Pulmonary:      Effort: Pulmonary effort is normal.      Breath sounds: Normal breath sounds.   Abdominal:      General: Bowel sounds are normal.      Palpations: Abdomen is soft.   Neurological:      Mental Status: She is alert.   Psychiatric:         Mood and Affect: Mood normal.           Oropharyngeal Classification II (hard and soft palate, upper portion of tonsils and uvula visible)  ASA PS Classification 3  Sedation Plan Deep  Procedure Plan - pre-procedural (re)assesment completed by physician:  discharge/transfer patient when discharge criteria met    Cora Benton MD  6/19/2025 12:48 PM           [1] No Known Allergies  [2]   Past Medical History:  Diagnosis Date    Diabetes mellitus (Multi) 2007    Gastro-esophageal reflux disease with esophagitis, without bleeding     Gastro-esophageal reflux disease with esophagitis    GERD (gastroesophageal reflux disease) 2000    Personal history of other endocrine, nutritional and metabolic disease     History of diabetes mellitus    Personal history of other endocrine, nutritional and metabolic disease     History of obesity

## 2025-06-19 NOTE — ANESTHESIA POSTPROCEDURE EVALUATION
Patient: Lori Post    Procedure Summary       Date: 06/19/25 Room / Location: [facility]    Anesthesia Start: 1254 Anesthesia Stop:     Procedures:       COLONOSCOPY      EGD Diagnosis:       Yeboah's esophagus with dysplasia      Tubular adenoma of colon    Scheduled Providers: Cora MCGOWAN MD Responsible Provider: SARAH Perera    Anesthesia Type: MAC ASA Status: 3            Anesthesia Type: MAC    Vitals Value Taken Time   /70 06/19/25 13:26   Temp 36.1 06/19/25 13:26   Pulse 69 06/19/25 13:26   Resp 16 06/19/25 13:26   SpO2 100 06/19/25 13:26       Anesthesia Post Evaluation    Patient location during evaluation: bedside  Patient participation: complete - patient participated  Level of consciousness: awake  Pain score: 0  Pain management: adequate  Airway patency: patent  Cardiovascular status: acceptable  Respiratory status: acceptable  Hydration status: acceptable  Postoperative Nausea and Vomiting: none        No notable events documented.

## 2025-06-19 NOTE — ANESTHESIA PREPROCEDURE EVALUATION
Patient: Lori Post    Procedure Information       Date/Time: 06/19/25 1230    Scheduled providers: Cora MCGOWAN MD    Procedures:       COLONOSCOPY      EGD    Location: [facility]            Relevant Problems   Anesthesia (within normal limits)      Cardiac (within normal limits)      Pulmonary (within normal limits)      Neuro (within normal limits)      GI   (+) Gastroesophageal reflux disease      /Renal (within normal limits)      Liver (within normal limits)      Endocrine   (+) Type 2 diabetes mellitus without complication, without long-term current use of insulin      Hematology (within normal limits)      Musculoskeletal (within normal limits)      HEENT (within normal limits)      ID (within normal limits)      Skin (within normal limits)      GYN (within normal limits)       Clinical information reviewed:   Tobacco  Allergies  Meds  Problems  Med Hx  Surg Hx  OB Status    Fam Hx  Soc Hx        NPO Detail:  NPO/Void Status  Date of Last Liquid: 06/19/25  Time of Last Liquid: 0800  Date of Last Solid: 06/17/25  Last Intake Type: Clear fluids         Physical Exam    Airway  Mallampati: II  TM distance: >3 FB  Neck ROM: full     Cardiovascular   Rhythm: regular  Rate: normal     Dental    Pulmonary Breath sounds clear to auscultation     Abdominal Abdomen: soft  Bowel sounds: normal           Anesthesia Plan    History of general anesthesia?: yes  History of complications of general anesthesia?: no    ASA 3     MAC     The patient is not a current smoker.  Patient was not previously instructed to abstain from smoking on day of procedure.  Education provided regarding risk of obstructive sleep apnea.  intravenous induction   Anesthetic plan and risks discussed with patient.    Plan discussed with CRNA.

## 2025-06-19 NOTE — DISCHARGE INSTRUCTIONS
Patient Instructions Post Endoscopy Procedure      The anesthetics, sedatives or narcotics which were given to you today will be acting in your body for the next 24 hours, so you might feel a little sleepy or groggy.  This feeling should slowly wear off. Carefully read and follow the instructions.     You received sedation today:  - Do not drive or operate any machinery or power tools of any kind.   - No alcoholic beverages today, not even beer or wine.  - Do not make any important decisions or sign any legal documents.  - No over the counter medications that contain alcohol or that may cause drowsiness.    While it is common to experience mild to moderate abdominal distention, gas, or belching after your procedure, if any of these symptoms occur following discharge from the GI Lab or within one week of having your procedure, call the [facility] [place] to be advised whether a visit to your nearest Urgent Care or Emergency Department is indicated.  Take this paper with you if you go.   - If you develop an allergic reaction to the medications that were given during your procedure such as difficulty breathing, rash, hives, severe nausea, vomiting or lightheadedness.  - If you experience chest pain, shortness of breath, severe abdominal pain, fevers and chills.  -If you develop signs and symptoms of bleeding such as blood in your spit, if your stools turn black, tarry, or bloody  - If you have not urinated within 8 hours following your procedure.  - If your IV site becomes painful, red, inflamed, or looks infected.    If you received a biopsy/polypectomy the following instructions apply below:  _x_ Do not use non-steroidal medications or anti-coagulants for one week following your procedure. (Examples of these types of medications are: Advil, Arthrotec, Aleve, Coumadin, Ecotrin, Heparin, Ibuprofen, Indocin, Motrin, Naprosyn, Nuprin, Plavix, Vioxx, and Voltarin, or their generic forms.  This list is not  all-inclusive.  Check with your physician or pharmacist before resuming medications.)   _x_ Eat a soft diet today.  Avoid foods that are poorly digested for the next 24 hours.  These foods would include: nuts, beans, lettuce, red meats, and fried foods. Start with liquids and advance your diet as tolerated, gradually work up to eating solids.   _x_ You can restart your ASA tomorrow  __ You can resume your anticoagulation therapy -     Your physician recommends the additional following instructions:    -You have a contact number available for emergencies. The signs and symptoms of potential delayed complications were discussed with you. You may return to normal activities tomorrow.  -Resume your previous diet or other if specified.  -Continue your present medications.   -We are waiting for your pathology results, if applicable. The results will be available in [employer]. I will send you a message with any recommendations.  -The findings and recommendations have been discussed with you and/or family.  -Please see Medication Reconciliation Form for new medication/medications prescribed.     If you experience any problems or have any questions following discharge from the GI Lab, please call: [phone] from 7 am- 4:30 pm.  In the event of an emergency please go to the closest Emergency Department or call Dr. Benton at [phone]

## 2025-06-25 LAB
LABORATORY COMMENT REPORT: NORMAL
PATH REPORT.FINAL DX SPEC: NORMAL
PATH REPORT.GROSS SPEC: NORMAL
PATH REPORT.TOTAL CANCER: NORMAL

## 2025-08-25 DIAGNOSIS — K21.00 GASTROESOPHAGEAL REFLUX DISEASE WITH ESOPHAGITIS, UNSPECIFIED WHETHER HEMORRHAGE: ICD-10-CM

## 2025-08-25 RX ORDER — OMEPRAZOLE 40 MG/1
40 CAPSULE, DELAYED RELEASE ORAL DAILY
Qty: 90 CAPSULE | Refills: 3 | Status: SHIPPED | OUTPATIENT
Start: 2025-08-25

## 2025-10-06 ENCOUNTER — APPOINTMENT (OUTPATIENT)
Dept: PRIMARY CARE | Facility: CLINIC | Age: 55
End: 2025-10-06
Payer: COMMERCIAL

## 2025-10-13 ENCOUNTER — APPOINTMENT (OUTPATIENT)
Dept: PRIMARY CARE | Facility: CLINIC | Age: 55
End: 2025-10-13
Payer: COMMERCIAL